# Patient Record
Sex: MALE | Race: WHITE | URBAN - METROPOLITAN AREA
[De-identification: names, ages, dates, MRNs, and addresses within clinical notes are randomized per-mention and may not be internally consistent; named-entity substitution may affect disease eponyms.]

---

## 2017-12-31 ENCOUNTER — HOSPITAL ENCOUNTER (EMERGENCY)
Facility: CLINIC | Age: 62
Discharge: HOME | End: 2017-12-31
Attending: NURSE PRACTITIONER | Admitting: NURSE PRACTITIONER
Payer: OTHER GOVERNMENT

## 2017-12-31 VITALS
SYSTOLIC BLOOD PRESSURE: 137 MMHG | OXYGEN SATURATION: 98 % | RESPIRATION RATE: 15 BRPM | DIASTOLIC BLOOD PRESSURE: 80 MMHG | TEMPERATURE: 97.9 F

## 2017-12-31 DIAGNOSIS — J11.1 INFLUENZA-LIKE ILLNESS: Primary | ICD-10-CM

## 2017-12-31 PROCEDURE — 99213 OFFICE O/P EST LOW 20 MIN: CPT

## 2017-12-31 PROCEDURE — 99213 OFFICE O/P EST LOW 20 MIN: CPT | Performed by: NURSE PRACTITIONER

## 2017-12-31 RX ORDER — OSELTAMIVIR PHOSPHATE 75 MG/1
75 CAPSULE ORAL 2 TIMES DAILY
Qty: 10 CAPSULE | Refills: 0 | Status: SHIPPED | OUTPATIENT
Start: 2017-12-31 | End: 2018-01-05

## 2017-12-31 ASSESSMENT — ENCOUNTER SYMPTOMS
SINUS PRESSURE: 1
SHORTNESS OF BREATH: 0
FEVER: 1
EYE REDNESS: 0
DIFFICULTY URINATING: 0
ACTIVITY CHANGE: 1
FATIGUE: 1
WHEEZING: 0
CHEST TIGHTNESS: 0
CHOKING: 0
CHILLS: 1
EYE DISCHARGE: 0
COUGH: 1
DIAPHORESIS: 1
SORE THROAT: 1
EYE PAIN: 0
RHINORRHEA: 1
APPETITE CHANGE: 1

## 2017-12-31 NOTE — ED AVS SNAPSHOT
[facility]    [address] [address]    Phone:  [phone]    Fax:  [phone]                                       Christopher Cullen   MRN: [id number]    Department:  [facility]   Date of Visit:  12/31/2017           Patient Information     Date Of Birth          1955        Your diagnoses for this visit were:     Influenza-like illness        You were seen by Aminta Armenta APRN CNP.      Follow-up Information     Follow up with Vilma Salgado NP In 1 week.    Specialty:  Nurse Practitioner - Family    Why:  If symptoms worsen, As needed    Contact information:    [address] [facility] [address]  [phone]        24 Hour Appointment Hotline       To make an appointment at any [facility] clinic, call [phone] ([phone]). If you don't have a family doctor or clinic, we will help you find one. [facility] clinics are conveniently located to serve the needs of you and your family.             Review of your medicines      START taking        Dose / Directions Last dose taken    oseltamivir 75 MG capsule   Commonly known as:  TAMIFLU   Dose:  75 mg   Quantity:  10 capsule        Take 1 capsule (75 mg) by mouth 2 times daily for 5 days   Refills:  0          Our records show that you are taking the medicines listed below. If these are incorrect, please call your family doctor or clinic.        Dose / Directions Last dose taken    aspirin 81 MG tablet   Dose:  1 tablet        Take 1 tablet by mouth daily.   Refills:  0        clotrimazole 1 % cream   Commonly known as:  LOTRIMIN   Quantity:  30 g        Apply to face and chest once daily in the morning.   Refills:  1        desonide 0.05 % cream   Commonly known as:  DESOWEN   Quantity:  30 g        Apply once daily at night to rash.   Refills:  1        HYDROcodone-acetaminophen 5-325 MG per tablet   Commonly known as:  NORCO   Dose:  1-2 tablet   Quantity:  24  tablet        Take 1-2 tablets by mouth every 6 hours as needed for moderate to severe pain   Refills:  0        ketoconazole 2 % shampoo   Commonly known as:  NIZORAL   Quantity:  120 mL        Apply to the affected area and wash off after 5 minutes. Use daily in shower   Refills:  3        methocarbamol 500 MG tablet   Commonly known as:  ROBAXIN   Dose:  500-1000 mg   Quantity:  30 tablet        Take 1-2 tablets (500-1,000 mg) by mouth 3 times daily as needed for muscle spasms   Refills:  0        senna-docusate 8.6-50 MG per tablet   Commonly known as:  [employer]S SENNA PLUS   Dose:  1 tablet   Quantity:  40 tablet        Take 1 tablet by mouth 2 times daily as needed for constipation.   Refills:  2        sildenafil 100 MG tablet   Commonly known as:  VIAGRA   Quantity:  10 tablet        Take  by mouth. Take 1/4 tab daily.    Never use with nitroglycerin, terazosin or doxazosin.   Refills:  11        simvastatin 20 MG tablet   Commonly known as:  ZOCOR   Dose:  20 mg   Quantity:  90 tablet        Take 1 tablet by mouth At Bedtime.   Refills:  3        TYLENOL 325 MG tablet   Generic drug:  acetaminophen        as needed   Refills:  0        VISTARIL 25 MG capsule   Dose:  25 mg   Generic drug:  hydrOXYzine        Take 25 mg by mouth 3 times daily as needed.   Refills:  0                Prescriptions were sent or printed at these locations (1 Prescription)                   [facility] [address] [address]    Telephone:  [phone]   Fax:  [phone]   Hours:                  E-Prescribed (1 of 1)         oseltamivir (TAMIFLU) 75 MG capsule                Orders Needing Specimen Collection     None      Pending Results     No orders found from 12/29/2017 to 1/1/2018.            Pending Culture Results     No orders found from 12/29/2017 to 1/1/2018.            Pending Results Instructions     If you had any lab results that were not finalized at the  "time of your Discharge, you can call the ED Lab Result RN at [phone]. You will be contacted by this team for any positive Lab results or changes in treatment. The nurses are available 7 days a week from 10A to 6:30P.  You can leave a message 24 hours per day and they will return your call.        Test Results From Your Hospital Stay               Thank you for choosing [facility]       Thank you for choosing [facility] for your care. Our goal is always to provide you with excellent care. Hearing back from our patients is one way we can continue to improve our services. Please take a few minutes to complete the written survey that you may receive in the mail after you visit with us. Thank you!        [employer] Information     1calendar lets you send messages to your doctor, view your test results, renew your prescriptions, schedule appointments and more. To sign up, go to [email or web address] . Click on \"Log in\" on the left side of the screen, which will take you to the Welcome page. Then click on \"Sign up Now\" on the right side of the page.     You will be asked to enter the access code listed below, as well as some personal information. Please follow the directions to create your username and password.     Your access code is: [email or web address]  Expires: 3/31/2018  6:29 PM     Your access code will  in 90 days. If you need help or a new code, please call your [facility] clinic or [phone].        Care EveryWhere ID     This is your Care EveryWhere ID. This could be used by other organizations to access your [facility] medical records  [id number]        Equal Access to Services     DOREEN ESCOBAR : Hadii barry Zavala, wadonalda lottieadaha, qaybta kaalmaalejandra tian. So [facility] [phone].    ATENCIÓN: Si habla español, tiene a laureano disposición servicios gratuitos de asistencia lingüística. Llame al [phone].    We comply with applicable federal civil rights laws and " [place] laws. We do not discriminate on the basis of race, color, national origin, age, disability, sex, sexual orientation, or gender identity.            After Visit Summary       This is your record. Keep this with you and show to your community pharmacist(s) and doctor(s) at your next visit.

## 2017-12-31 NOTE — ED AVS SNAPSHOT
[facility]    [address] [facility] [address]    Phone:  [phone]    Fax:  [phone]                                       Christopher Cullen   MRN: [id number]    Department:  [facility]   Date of Visit:  12/31/2017           After Visit Summary Signature Page     I have received my discharge instructions, and my questions have been answered. I have discussed any challenges I see with this plan with the nurse or doctor.    ..........................................................................................................................................  Patient/Patient Representative Signature      ..........................................................................................................................................  Patient Representative Print Name and Relationship to Patient    ..................................................               ................................................  Date                                            Time    ..........................................................................................................................................  Reviewed by Signature/Title    ...................................................              ..............................................  Date                                                            Time

## 2018-01-01 NOTE — ED PROVIDER NOTES
History     Chief Complaint   Patient presents with     URI     for 2 weeks, productive cough     HPI  Christopher Cullen is a 62 year old male who presents with reports of cold and cough type symptoms for the past 2 weeks and then since Friday new onset of fever, aches, chills, sinus congestion, worsening cough, tightness in the chest, headache, fatigue, emesis once this past Friday with none since, decreased activity, decreased appetite,.  Patient denies chest pain, vomiting, dysuria.    Problem List:    Patient Active Problem List    Diagnosis Date Noted     Lipoma of other skin and subcutaneous tissue 09/27/2012     Priority: Medium     Hyperlipidemia LDL goal <130 03/22/2012     Priority: Medium     Prostate cancer (H) 03/21/2012     Priority: Medium     S/P complete repair of rotator cuff 05/26/2010     Priority: Medium        Past Medical History:    Past Medical History:   Diagnosis Date     Hyperlipidaemia      Prostate cancer (H) 3/21/2012       Past Surgical History:    Past Surgical History:   Procedure Laterality Date     ARTHROSCOPY SHOULDER ROTATOR CUFF REPAIR  11/26/10    left shoulder     DAVINCI PROSTATECTOMY  3/30/2012    Procedure:DAVINCI PROSTATECTOMY; Davinci Assisted Laparoscopic Radical Prostatectomy; Surgeon:GIRMA DAWKINS; Location:[facility]     ESOPHAGOSCOPY, GASTROSCOPY, DUODENOSCOPY (EGD), COMBINED N/A 12/21/2015    Procedure: COMBINED ESOPHAGOSCOPY, GASTROSCOPY, DUODENOSCOPY (EGD), REMOVE FOREIGN BODY;  Surgeon: Jarocho Abbott MD;  Location: [facility]     HC REPAIR ROTATOR CUFF,ACUTE  5/21/10    right shoulder     SURGICAL HISTORY OF -       removal of cyst       Family History:    Family History   Problem Relation Age of Onset     Depression Mother      Arthritis Mother      C.A.D. Mother      MI     Lipids Father      HIGH CHOLESTEROL       Social History:  Marital Status:   [2]  Social History   Substance Use Topics     Smoking status: Former Smoker     Packs/day: 1.00      Years: 30.00     Types: Cigarettes     Smokeless tobacco: Never Used      Comment: quit 2000     Alcohol use No        Medications:      oseltamivir (TAMIFLU) 75 MG capsule   HYDROcodone-acetaminophen (NORCO) 5-325 MG per tablet   methocarbamol (ROBAXIN) 500 MG tablet   clotrimazole (LOTRIMIN) 1 % cream   desonide (DESOWEN) 0.05 % cream   ketoconazole (NIZORAL) 2 % shampoo   sildenafil (VIAGRA) 100 MG tablet   senna-docusate (CVS SENNA PLUS) 8.6-50 MG per tablet   aspirin 81 MG tablet   simvastatin (ZOCOR) 20 MG tablet   hydrOXYzine (VISTARIL) 25 MG capsule   TYLENOL 325 MG PO TABS     On simvastatin,  Fish oil  Vitamin D  ASA 81  Vitamin C - 2 tabs daily  Review of Systems   Constitutional: Positive for activity change, appetite change, chills, diaphoresis, fatigue and fever.   HENT: Positive for congestion, postnasal drip, rhinorrhea, sinus pressure and sore throat. Negative for ear discharge and ear pain.    Eyes: Negative for pain, discharge and redness.   Respiratory: Positive for cough. Negative for choking, chest tightness, shortness of breath and wheezing.    Cardiovascular: Negative for chest pain.   Genitourinary: Negative for difficulty urinating.   All other systems reviewed and are negative.      Physical Exam   BP: 137/80  Heart Rate: 82  Temp: 97.9  F (36.6  C)  Resp: 15  SpO2: 98 %      Physical Exam   Constitutional: He appears well-developed and well-nourished. He is cooperative. He does not appear ill.   HENT:   Head: Normocephalic and atraumatic.   Right Ear: Hearing, tympanic membrane, external ear and ear canal normal.   Left Ear: Hearing, tympanic membrane, external ear and ear canal normal.   Nose: Mucosal edema (with erythema) and rhinorrhea present.   Mouth/Throat: Uvula is midline, oropharynx is clear and moist and mucous membranes are normal.   Eyes: Conjunctivae are normal. Right eye exhibits no discharge. Left eye exhibits no discharge.   Neck: Neck supple. No tracheal deviation  present. No thyromegaly present.   Cardiovascular: Normal rate, regular rhythm and normal heart sounds.  Exam reveals no gallop and no friction rub.    No murmur heard.  Pulmonary/Chest: Effort normal and breath sounds normal. No respiratory distress. He has no wheezes. He has no rales. He exhibits no tenderness.   Lymphadenopathy:     He has cervical adenopathy (moderate).   Neurological: He is alert.   Skin: Skin is warm. No rash noted.   Psychiatric: He has a normal mood and affect.   Nursing note and vitals reviewed.      ED Course     ED Course     Procedures      Labs Ordered and Resulted from Time of ED Arrival Up to the Time of Departure from the ED - No data to display    Assessments & Plan (with Medical Decision Making)     I have reviewed the nursing notes.    I have reviewed the findings, diagnosis, plan and need for follow up with the patient.  Christopher Cullen is a 62 year old male who presents with reports of cold and cough type symptoms for the past 2 weeks and then since Friday new onset of fever, aches, chills, sinus congestion, worsening cough, tightness in the chest, headache, fatigue, emesis once this past Friday with none since, decreased activity, decreased appetite,.  Patient denies chest pain, vomiting, dysuria.    Exam as noted above.  Explained the patient the options of testing and also explained that patient has classic symptoms for the influenza which was now very prevalent.  Discussed treatment with Tamiflu versus no treatment and discussed treatment for the influenza virus in general.  Patient wishes to take the Tamiflu and verbalizes understanding.  He denies any questions at this point in time.  DDx:  bronchitis, pneumonia, Viral URI (ie.. Influenza), sinusitis    Discharge Medication List as of 12/31/2017  6:29 PM      START taking these medications    Details   oseltamivir (TAMIFLU) 75 MG capsule Take 1 capsule (75 mg) by mouth 2 times daily for 5 days, Disp-10 capsule, R-0,  E-Prescribe             Final diagnoses:   Influenza-like illness       12/31/2017   [facility] EMERGENCY DEPARTMENT     Aminta Armenta, APRN CNP  12/31/17 1938

## 2018-01-04 ENCOUNTER — HOSPITAL ENCOUNTER (EMERGENCY)
Facility: CLINIC | Age: 63
Discharge: HOME | End: 2018-01-04
Attending: PHYSICIAN ASSISTANT | Admitting: PHYSICIAN ASSISTANT
Payer: OTHER GOVERNMENT

## 2018-01-04 VITALS
RESPIRATION RATE: 16 BRPM | SYSTOLIC BLOOD PRESSURE: 138 MMHG | OXYGEN SATURATION: 98 % | DIASTOLIC BLOOD PRESSURE: 75 MMHG | TEMPERATURE: 98.4 F

## 2018-01-04 DIAGNOSIS — J11.1 INFLUENZA-LIKE ILLNESS: ICD-10-CM

## 2018-01-04 DIAGNOSIS — R05.9 COUGH: ICD-10-CM

## 2018-01-04 PROCEDURE — G0463 HOSPITAL OUTPT CLINIC VISIT: HCPCS

## 2018-01-04 PROCEDURE — 99213 OFFICE O/P EST LOW 20 MIN: CPT | Performed by: PHYSICIAN ASSISTANT

## 2018-01-04 RX ORDER — BENZONATATE 200 MG/1
200 CAPSULE ORAL 3 TIMES DAILY PRN
Qty: 30 CAPSULE | Refills: 0 | Status: SHIPPED | OUTPATIENT
Start: 2018-01-04 | End: 2018-08-16

## 2018-01-04 NOTE — ED AVS SNAPSHOT
[facility]    [address] [facility] [address]    Phone:  [phone]    Fax:  [phone]                                       Christopher Cullen   MRN: [id number]    Department:  [facility]   Date of Visit:  1/4/2018           Patient Information     Date Of Birth          1955        Your diagnoses for this visit were:     Influenza-like illness     Cough        You were seen by Polo Senior PA-C.      Discharge References/Attachments     (ADULT), INFLUENZA (ENGLISH)      24 Hour Appointment Hotline       To make an appointment at any [facility] clinic, call [phone] ([phone]). If you don't have a family doctor or clinic, we will help you find one. [facility] clinics are conveniently located to serve the needs of you and your family.             Review of your medicines      START taking        Dose / Directions Last dose taken    benzonatate 200 MG capsule   Commonly known as:  TESSALON   Dose:  200 mg   Quantity:  30 capsule        Take 1 capsule (200 mg) by mouth 3 times daily as needed for cough   Refills:  0          Our records show that you are taking the medicines listed below. If these are incorrect, please call your family doctor or clinic.        Dose / Directions Last dose taken    aspirin 81 MG tablet   Dose:  1 tablet        Take 1 tablet by mouth daily.   Refills:  0        clotrimazole 1 % cream   Commonly known as:  LOTRIMIN   Quantity:  30 g        Apply to face and chest once daily in the morning.   Refills:  1        desonide 0.05 % cream   Commonly known as:  DESOWEN   Quantity:  30 g        Apply once daily at night to rash.   Refills:  1        HYDROcodone-acetaminophen 5-325 MG per tablet   Commonly known as:  NORCO   Dose:  1-2 tablet   Quantity:  24 tablet        Take 1-2 tablets by mouth every 6 hours as needed for moderate to severe pain   Refills:  0        ketoconazole 2 % shampoo   Commonly known as:   NIZORAL   Quantity:  120 mL        Apply to the affected area and wash off after 5 minutes. Use daily in shower   Refills:  3        methocarbamol 500 MG tablet   Commonly known as:  ROBAXIN   Dose:  500-1000 mg   Quantity:  30 tablet        Take 1-2 tablets (500-1,000 mg) by mouth 3 times daily as needed for muscle spasms   Refills:  0        oseltamivir 75 MG capsule   Commonly known as:  TAMIFLU   Dose:  75 mg   Quantity:  10 capsule        Take 1 capsule (75 mg) by mouth 2 times daily for 5 days   Refills:  0        senna-docusate 8.6-50 MG per tablet   Commonly known as:  CVS SENNA PLUS   Dose:  1 tablet   Quantity:  40 tablet        Take 1 tablet by mouth 2 times daily as needed for constipation.   Refills:  2        sildenafil 100 MG tablet   Commonly known as:  VIAGRA   Quantity:  10 tablet        Take  by mouth. Take 1/4 tab daily.    Never use with nitroglycerin, terazosin or doxazosin.   Refills:  11        simvastatin 20 MG tablet   Commonly known as:  ZOCOR   Dose:  20 mg   Quantity:  90 tablet        Take 1 tablet by mouth At Bedtime.   Refills:  3        TYLENOL 325 MG tablet   Generic drug:  acetaminophen        as needed   Refills:  0        VISTARIL 25 MG capsule   Dose:  25 mg   Generic drug:  hydrOXYzine        Take 25 mg by mouth 3 times daily as needed.   Refills:  0                Prescriptions were sent or printed at these locations (1 Prescription)                   [facility] [place], [place] - [address] [facility]   [address] [facility] [address]    Telephone:  [phone]   Fax:  [phone]   Hours:                  E-Prescribed (1 of 1)         benzonatate (TESSALON) 200 MG capsule                Orders Needing Specimen Collection     None      Pending Results     No orders found from 1/2/2018 to 1/5/2018.            Pending Culture Results     No orders found from 1/2/2018 to 1/5/2018.            Pending Results Instructions     If you had any lab results that were  "not finalized at the time of your Discharge, you can call the ED Lab Result RN at [phone]. You will be contacted by this team for any positive Lab results or changes in treatment. The nurses are available 7 days a week from 10A to 6:30P.  You can leave a message 24 hours per day and they will return your call.        Test Results From Your Hospital Stay               Thank you for choosing [facility]       Thank you for choosing [facility] for your care. Our goal is always to provide you with excellent care. Hearing back from our patients is one way we can continue to improve our services. Please take a few minutes to complete the written survey that you may receive in the mail after you visit with us. Thank you!        [employer] Information     [facility] lets you send messages to your doctor, view your test results, renew your prescriptions, schedule appointments and more. To sign up, go to [email or web address] . Click on \"Log in\" on the left side of the screen, which will take you to the Welcome page. Then click on \"Sign up Now\" on the right side of the page.     You will be asked to enter the access code listed below, as well as some personal information. Please follow the directions to create your username and password.     Your access code is: [email or web address]  Expires: 3/31/2018  6:29 PM     Your access code will  in 90 days. If you need help or a new code, please call your [facility] clinic or [phone].        Care EveryWhere ID     This is your Care EveryWhere ID. This could be used by other organizations to access your [facility] medical records  [id number]        Equal Access to Services     [facility]: Hadii barry aZvala, wadonalda lurebekah, qaybta kaalalejandra arevalo . So [facility] [phone].    ATENCIÓN: Si habla español, tiene a laureano disposición servicios gratuitos de asistencia lingüística. Llame al [phone].    We comply with applicable federal " civil rights laws and [place] laws. We do not discriminate on the basis of race, color, national origin, age, disability, sex, sexual orientation, or gender identity.            After Visit Summary       This is your record. Keep this with you and show to your community pharmacist(s) and doctor(s) at your next visit.

## 2018-01-04 NOTE — ED PROVIDER NOTES
Chief Complaint:     Chief Complaint   Patient presents with     URI     Pt had cold last week, in bed Friday to Saturday.  Seen on Sunday, started on Tamiflu.  Keeping fluids down, not feeling much better.  Coughing frequently and feeling drained.        HPI: Christopher Cullen is an 62 year old male who presents with a cough. Patient was seen 5 days ago and Dx with influenza.  He was started on Tamiflu, and is feeling better.  He continues to cough, and this is worse at night.  His body aches and fever have resolved.  He does get an occasional headache usually after coughing.  He has not taken anything for the headache.  He has not had any tylenol since yesterday.  He denies any chest pain or shortness of breath.  No hemoptysis.  No diarrhea, abdominal pain or vomiting.  No neck pain.       Recent travel?  no.    ROS: Further problem focused system review was otherwise negative.     Respiratory History  occasional episodes of bronchitis     Problem history  Patient Active Problem List   Diagnosis     S/P complete repair of rotator cuff     Prostate cancer (H)     Hyperlipidemia LDL goal <130     Lipoma of other skin and subcutaneous tissue        Allergies  No Known Allergies     Smoking History  History   Smoking Status     Former Smoker     Packs/day: 1.00     Years: 30.00     Types: Cigarettes   Smokeless Tobacco     Never Used     Comment: quit 2000        Current Meds  No current facility-administered medications for this encounter.     Current Outpatient Prescriptions:      benzonatate (TESSALON) 200 MG capsule, Take 1 capsule (200 mg) by mouth 3 times daily as needed for cough, Disp: 30 capsule, Rfl: 0     oseltamivir (TAMIFLU) 75 MG capsule, Take 1 capsule (75 mg) by mouth 2 times daily for 5 days, Disp: 10 capsule, Rfl: 0     HYDROcodone-acetaminophen (NORCO) 5-325 MG per tablet, Take 1-2 tablets by mouth every 6 hours as needed for moderate to severe pain, Disp: 24 tablet, Rfl: 0     methocarbamol (ROBAXIN)  500 MG tablet, Take 1-2 tablets (500-1,000 mg) by mouth 3 times daily as needed for muscle spasms, Disp: 30 tablet, Rfl: 0     clotrimazole (LOTRIMIN) 1 % cream, Apply to face and chest once daily in the morning., Disp: 30 g, Rfl: 1     desonide (DESOWEN) 0.05 % cream, Apply once daily at night to rash., Disp: 30 g, Rfl: 1     ketoconazole (NIZORAL) 2 % shampoo, Apply to the affected area and wash off after 5 minutes. Use daily in shower, Disp: 120 mL, Rfl: 3     sildenafil (VIAGRA) 100 MG tablet, Take  by mouth. Take 1/4 tab daily.    Never use with nitroglycerin, terazosin or doxazosin., Disp: 10 tablet, Rfl: 11     senna-docusate ([employer] SENNA PLUS) 8.6-50 MG per tablet, Take 1 tablet by mouth 2 times daily as needed for constipation., Disp: 40 tablet, Rfl: 2     aspirin 81 MG tablet, Take 1 tablet by mouth daily., Disp: , Rfl:      simvastatin (ZOCOR) 20 MG tablet, Take 1 tablet by mouth At Bedtime., Disp: 90 tablet, Rfl: 3     hydrOXYzine (VISTARIL) 25 MG capsule, Take 25 mg by mouth 3 times daily as needed., Disp: , Rfl:      TYLENOL 325 MG PO TABS, as needed, Disp: , Rfl:         OBJECTIVE     Vital signs reviewed by Polo Senior  /75  Temp 98.4  F (36.9  C) (Oral)  Resp 16  SpO2 98%     PEFR:  General appearance: healthy, alert and no distress  Ears: R TM - normal: no effusions, no erythema, and normal landmarks, L TM - normal: no effusions, no erythema, and normal landmarks  Eyes: R normal, L normal  Nose: mucosal edema  Oropharynx: mild erythema  Neck: supple and no adenopathy  Lungs: normal and clear to auscultation  Heart: S1, S2 normal, no murmur, click, rub or gallop, regular rate and rhythm, chest is clear without rales or wheezing, no pedal edema, no JVD, no hepatosplenomegaly  Abdomen: Abdomen soft, non-tender without masses or organomegaly         ASSESSMENT    1. Influenza-like illness    2. Cough        PLAN  Patient is improving.  Discussed length of influenza illness.  Discussed  imaging and patient declined at this time.   Rest, Push fluids, vaporizer, elevation of head of bed.  Tessalon cough suppressant- TID PRN- as discussed.   If symptoms worsen, recheck immediately otherwise follow up with your PCP PRN.  Patient verbalized understanding and agreed with this plan.       Polo Senior  1/4/2018, 12:00 PM       Polo Senior PA-C  01/04/18 122

## 2018-01-04 NOTE — ED AVS SNAPSHOT
[facility]    [address] [address]    Phone:  [phone]    Fax:  [phone]                                       Christopher Cullen   MRN: [id number]    Department:  [facility]   Date of Visit:  1/4/2018           After Visit Summary Signature Page     I have received my discharge instructions, and my questions have been answered. I have discussed any challenges I see with this plan with the nurse or doctor.    ..........................................................................................................................................  Patient/Patient Representative Signature      ..........................................................................................................................................  Patient Representative Print Name and Relationship to Patient    ..................................................               ................................................  Date                                            Time    ..........................................................................................................................................  Reviewed by Signature/Title    ...................................................              ..............................................  Date                                                            Time

## 2018-02-01 ENCOUNTER — TRANSFERRED RECORDS (OUTPATIENT)
Dept: HEALTH INFORMATION MANAGEMENT | Facility: CLINIC | Age: 63
End: 2018-02-01

## 2018-02-02 ENCOUNTER — TRANSFERRED RECORDS (OUTPATIENT)
Dept: HEALTH INFORMATION MANAGEMENT | Facility: CLINIC | Age: 63
End: 2018-02-02

## 2018-05-15 ENCOUNTER — TRANSFERRED RECORDS (OUTPATIENT)
Dept: HEALTH INFORMATION MANAGEMENT | Facility: CLINIC | Age: 63
End: 2018-05-15

## 2018-06-14 ENCOUNTER — TRANSFERRED RECORDS (OUTPATIENT)
Dept: HEALTH INFORMATION MANAGEMENT | Facility: CLINIC | Age: 63
End: 2018-06-14

## 2018-06-26 ENCOUNTER — TRANSFERRED RECORDS (OUTPATIENT)
Dept: HEALTH INFORMATION MANAGEMENT | Facility: CLINIC | Age: 63
End: 2018-06-26

## 2018-08-10 DIAGNOSIS — M54.50 LOWER BACK PAIN: Primary | ICD-10-CM

## 2018-08-16 ENCOUNTER — RADIANT APPOINTMENT (OUTPATIENT)
Dept: GENERAL RADIOLOGY | Facility: CLINIC | Age: 63
End: 2018-08-16
Attending: NEUROLOGICAL SURGERY

## 2018-08-16 ENCOUNTER — OFFICE VISIT (OUTPATIENT)
Dept: NEUROSURGERY | Facility: CLINIC | Age: 63
End: 2018-08-16
Payer: OTHER GOVERNMENT

## 2018-08-16 ENCOUNTER — RADIANT APPOINTMENT (OUTPATIENT)
Dept: GENERAL RADIOLOGY | Facility: CLINIC | Age: 63
End: 2018-08-16
Attending: NEUROLOGICAL SURGERY
Payer: OTHER GOVERNMENT

## 2018-08-16 VITALS
HEART RATE: 63 BPM | DIASTOLIC BLOOD PRESSURE: 77 MMHG | WEIGHT: 197.4 LBS | BODY MASS INDEX: 27.64 KG/M2 | HEIGHT: 71 IN | SYSTOLIC BLOOD PRESSURE: 132 MMHG

## 2018-08-16 DIAGNOSIS — C61 PROSTATE CANCER (H): ICD-10-CM

## 2018-08-16 DIAGNOSIS — M54.50 LOWER BACK PAIN: ICD-10-CM

## 2018-08-16 DIAGNOSIS — M53.3 SACROILIAC JOINT DYSFUNCTION OF RIGHT SIDE: Primary | ICD-10-CM

## 2018-08-16 ASSESSMENT — PAIN SCALES - GENERAL: PAINLEVEL: NO PAIN (0)

## 2018-08-16 NOTE — PATIENT INSTRUCTIONS
Recommend Sacroiliac joint PT and conservative management.  No indication for lumbar surgical intervention at this time.    Regarding incidental findings of T2 hyperintensity of lumbar vertebral body- see primary care physician to order a PET CT scan due to history of prostate cancer to rule out a metastatic lesion there. This lesion is NOT related to presenting symptoms but should be evaluated to be sure it does not represent malignancy. [facility]

## 2018-08-16 NOTE — PROGRESS NOTES
Chief Complaint: Worsening RLE pain.    History of Present Illness:  It was a pleasure to evaluate Christopher Cullen in clinic today.    Christopher Cullen is a 63 year old male referred to Dr. Hayden by the [facility] presenting with 3 years of RLE pain from just above the knee to 2/3 of the way down his calf, worsened in the past 8 months.  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    He denies pain in his upper RLE/hip and LLE.    His leg pain is particularly worsened while driving for long distances, which he does several times per month; while driving, he experiences the pain approximately once per hour.  His leg pain is improved with positional weight offloading while sitting and with walking and stretching of his RLE.    Mr. Cullen denies taking any medications for relief of his leg pain.  He has undergone physical therapy with traction for his back pain while serving in the  but has not undertaken physical therapy for his leg pain other than self-directed workouts.  He has not received any injections or surgery for his pain.      Review of Systems   Constitutional: Negative for fatigue, change in level of energy, or unexpected weight change.  HENT: Negative for trouble swallowing.    Eyes: Wears spectacles. Negative for recent changes.  Respiratory: Negative for shortness of breath.    Cardiovascular: Negative for leg swelling.  Gastrointestinal: Negative for nausea, vomiting.  Endocrine: Negative for heat/cold intolerance.  Genitourinary: Negative for incontinence, frequency and urgency.   Musculoskeletal: Positive for right-sided buttock pain, intermittent right lower extremity pain, and neck stiffness. Negative for gait problem, neck pain, and left lower extremity pain.  Skin: Negative for recent changes.  Allergic/Immunologic: Negative for immunocompromised  state.  Neurological: Negative for speech difficulty, weakness, and numbness.   Hematological: Does not bruise/bleed easily.  Psychiatric/Behavioral: The patient is not nervous/anxious.    Past Medical History:   Diagnosis Date     Hyperlipidaemia      Prostate cancer (H) 3/21/2012       Past Surgical History:   Procedure Laterality Date     ARTHROSCOPY SHOULDER ROTATOR CUFF REPAIR  11/26/10    left shoulder     DAVINCI PROSTATECTOMY  3/30/2012    Procedure:DAVINCI PROSTATECTOMY; Davinci Assisted Laparoscopic Radical Prostatectomy; Surgeon:GIRMA DAWKINS; Location:[facility]     ESOPHAGOSCOPY, GASTROSCOPY, DUODENOSCOPY (EGD), COMBINED N/A 12/21/2015    Procedure: COMBINED ESOPHAGOSCOPY, GASTROSCOPY, DUODENOSCOPY (EGD), REMOVE FOREIGN BODY;  Surgeon: Jarocho Abbott MD;  Location: [facility]     HC REPAIR ROTATOR CUFF,ACUTE  5/21/10    right shoulder     SURGICAL HISTORY OF -       removal of cyst       Social History     Social History     Marital status:      Spouse name: N/A     Number of children: N/A     Years of education: N/A     Occupational History      [employer]     Social History Main Topics     Smoking status: Former Smoker     Packs/day: 1.00     Years: 30.00     Types: Cigarettes     Smokeless tobacco: Never Used      Comment: quit 2000     Alcohol use No     Drug use: No     Sexual activity: Not Asked     Other Topics Concern     Parent/Sibling W/ Cabg, Mi Or Angioplasty Before 65f 55m? No     Social History Narrative       family history includes Arthritis in his mother; C.A.D. in his mother; Depression in his mother; Lipids in his father.        IMAGING per my own measurement and interpretation:  Xrays:standing long cassette 8/16/2018    Pelvic Incidence: 45 degrees  Lumbar Lordosis: 32 degrees  PI-LL mismatch: 13 degrees      Sagittal vertical alignment (C7 samir line to posterior corner of sacrum): 67mm      MRI lumbar spine 6/14/2018:  No significant foraminal stenosis corresponding to  "L3, L4, or L5 right-sided nerve roots. Multilevel spondylosis and central/lateral recess stenosis    Resulted Imaging/Labs:  Bone Density:  DEXA 6/26/2018:  Normal bone density    Vitamin D:  No results found for: LEN763, FWGR878, IONM80BIPHA, VITD3, D2VIT, D3VIT, DTOT, [id number], [id number], [id number], [id number], [id number], [id number]    Nutritional Status:    Estimated body mass index is 27.53 kg/(m^2) as calculated from the following:    Height as of this encounter: 1.803 m (5' 11\").    Weight as of this encounter: 89.5 kg (197 lb 6.4 oz).    Complete \"Weight Managment Plan\" in the progress note from the Adult Preventative or Medicare smartsets, use phrase .WEIGHTPLAN, or choose an option from Weight Management Resources smartset below.      No results found for: ALBUMIN    Diabetes Screening:  Lab Results   Component Value Date    A1C 5.5 09/23/2011       Nicotine Usage:  Previous cigarette smoker: 30 pack-year history.     Physical Exam   Constitutional: Oriented to person, place, and time. Appears well-developed and well-nourished. Cooperative. No distress.   HENT:  Head: Normocephalic and atraumatic.   Eyes: Bespectacled. Sclerae anicteric.  Pulmonary/Chest: Effort normal.  Musculoskeletal:   Lumbar flexion/extension range of motion:   Leg pain not reproduced with flexion abduction external rotation of hip, axial loading of the hip, or prolonged crossing of legs.     SI joint tenderness to finger palpation.    Neurological: Alert and oriented to person, place, and time. Gait normal. Displays no Babinski's sign on the right side. Displays no Babinski's sign on the left side.   Reflex Scores:        Patellar reflexes are 2+ on the right side and 2+ on the left side.       Achilles reflexes are 2+ on the right side and 2+ on the left side.    STRENGTH LEFT RIGHT       Hip Flexion     5     5   Knee Extension 5 5   Ankle Dorsiflexion 5 5   Extensor Hallucis Longus 5 5   Plantar Flexion 5 5   Foot eversion 5 5 "   Foot inversion 5 5     Able to tandem walk      Sacroiliac Joint Exam LEFT RIGHT   Marilyn Finger Test - +   PSIS tenderness - +   ThighThrust - -   MARIA GUADALUPE - +   Pelvic Gapping - -   Pelvic Compression - +   Gaenslen s - +   Sacral Thrust - -   Hip Exam     Posterior impingement - -   Medial femoral impingement - -       Skin: Skin is warm, dry and intact.  Psychiatric: Normal mood and affect. Speech is normal and behavior is normal.      ASSESSMENT:  Christopher Cullen is a 63 year old male with notable past medical history of prostate cancer and 30 pack-year smoking history presenting with chronic lower back pain/stiffness and 3 years of right lower extremity pain worsened over the past 8 months.  Patient history and physical examination most suggestive of pain secondary to osteoarthritis of the right-sided sacroiliac joint.    MRI findings do not demonstrate substantial nerve root compression that would correspond to the distribution of his pain.  Additionally, the patient's report of pain with sitting and improved with walking further suggests that his pain is likely not neurologic in origin.    However, given Mr. Cullen's history of prostate cancer s/p prostatectomy and L3 and L4 vertebral body hyperintensities on MRI, further workup should be pursued given differential diagnosis of hemangioma versus possible metastatic lesion.    PLAN:  During this patient encounter, Dr. Hayden educated the patient on several pain treatment techniques, including weight offloading/sacral repositioning, leg crossing as potential cause of SI.    Mr. Cullen will require physical therapy for arthritis of right-sided sacroiliac joint.     Additionally, the patient will require followup PET nuclear scan of at least the lumbar vertebrae to further evaluate L3 and L4 vertebral body hyperintensities seen on MRI.  We advised Mr. Cullen that he should pursue this workup via his PCP at the [facility].    Aly Hayden,  M.D.  Neurosurgery Resident, PGY-1  (P) [phone]      Right sacroiliac joint irritation on exam; no radicular-distribution leg pain (isolated pain from knee to lateral calf, not provoked with Tinel's not consistent with peroneal nerve pain), no weakness or numbness. Symptoms are worse with sitting and relieved by standing and walking.    Recommend Sacroiliac joint PT (ordered) and conservative management.    No indication for lumbar surgical intervention at this time- lumbar degenerative spondylosis on MRI and small PI-LL mismatch of 13 degrees on xray is not causing significant symptoms at this time. No significant right L5-S1 foraminal stenosis    DEXA normal  Regarding incidental findings of T2 hyperintensity of lumbar vertebral body- I agree with [facility]'s recommendation that primary care physician order a PET CT scan due to patient's history of prostate cancer to rule out a metastatic lesion there. This lesion is NOT related to patient's presenting symptoms.     No further scheduled followup needed with me. Patient [facility] to [facility] and followup those results there.    Thirty pages of [facility] medical records obtained and reviewed including DEXA scan, MRI, lumbar xrays, prior PCP visit reviewing prostate cancer treated with prostatectomy      I saw, evaluated, and examined the patient with the resident and personally reviewed and interpreted all imaging and labs and formulated the plan.    Kristi Hayden MD    [facility] Department of Neurosurgery  Office: [phone]    8/16/2018            reviewed and/or ordered clinical laboratory tests, reviewed and/or ordered tests in radiology, reviewed and/or ordered medical tests, made the decision to obtain old records and/or history from someone other than the patient and Reviewed and summarized old records and/or discussed this case with another health care provider

## 2018-08-16 NOTE — MR AVS SNAPSHOT
After Visit Summary   2018    Christopher Cullen    MRN: [id number]           Patient Information     Date Of Birth          1955        Visit Information        Provider Department      2018 7:15 AM Kristi Hayden MD [facility] Neurosurgery        Today's Diagnoses     Sacroiliac joint dysfunction of right side    -  1    Prostate cancer (H)          Care Instructions    Recommend Sacroiliac joint PT and conservative management.  No indication for lumbar surgical intervention at this time.    Regarding incidental findings of T2 hyperintensity of lumbar vertebral body- see primary care physician to order a PET CT scan due to history of prostate cancer to rule out a metastatic lesion there. This lesion is NOT related to presenting symptoms but should be evaluated to be sure it does not represent malignancy. [facility]              Follow-ups after your visit        Who to contact     Please call your clinic at [phone] to:    Ask questions about your health    Make or cancel appointments    Discuss your medicines    Learn about your test results    Speak to your doctor            Additional Information About Your Visit        [employer] Information     [employer] is an electronic gateway that provides easy, online access to your medical records. With [employer], you can request a clinic appointment, read your test results, renew a prescription or communicate with your care team.     To sign up for [employer] visit the website at [email or web address]   You will be asked to enter the access code listed below, as well as some personal information. Please follow the directions to create your username and password.     Your access code is: [email or web address]  Expires: 10/31/2018  6:30 AM     Your access code will  in 90 days. If you need help or a new code, please contact your [facility] or call [phone] for assistance.        Care  "EveryWhere ID     This is your Care EveryWhere ID. This could be used by other organizations to access your [place] medical records  [id number]        Your Vitals Were     Pulse Height BMI (Body Mass Index)             63 1.803 m (5' 11\") 27.53 kg/m2          Blood Pressure from Last 3 Encounters:   08/16/18 132/77   01/04/18 138/75   12/31/17 137/80    Weight from Last 3 Encounters:   08/16/18 89.5 kg (197 lb 6.4 oz)   03/21/16 95.3 kg (210 lb)   04/13/15 96.9 kg (213 lb 9.6 oz)              Today, you had the following     No orders found for display       Primary Care Provider Fax #    [facility] [phone] [address] [address]        Equal Access to Services     DOREEN ESCOBAR : Gilles Zavala, waaxlaurel longorai, qaybta kaalmalaurel burnette, alejandra salgado . So [facility] [phone].    ATENCIÓN: Si habla español, tiene a laureano disposición servicios gratuitos de asistencia lingüística. Renetta al [phone].    We comply with applicable federal civil rights laws and [place] laws. We do not discriminate on the basis of race, color, national origin, age, disability, sex, sexual orientation, or gender identity.            Thank you!     Thank you for choosing [facility]  for your care. Our goal is always to provide you with excellent care. Hearing back from our patients is one way we can continue to improve our services. Please take a few minutes to complete the written survey that you may receive in the mail after your visit with us. Thank you!             Your Updated Medication List - Protect others around you: Learn how to safely use, store and throw away your medicines at www.disposemymeds.org.          This list is accurate as of 8/16/18  7:52 AM.  Always use your most recent med list.                   Brand Name Dispense Instructions for use Diagnosis    aspirin 81 MG tablet      Take 1 tablet by mouth daily.        " clotrimazole 1 % cream    LOTRIMIN    30 g    Apply to face and chest once daily in the morning.    Seborrhoeic dermatitis       desonide 0.05 % cream    DESOWEN    30 g    Apply once daily at night to rash.    Seborrhoeic dermatitis       HYDROcodone-acetaminophen 5-325 MG per tablet    NORCO    24 tablet    Take 1-2 tablets by mouth every 6 hours as needed for moderate to severe pain    Acute low back pain       ketoconazole 2 % shampoo    NIZORAL    120 mL    Apply to the affected area and wash off after 5 minutes. Use daily in shower    Seborrhoeic dermatitis       sildenafil 100 MG tablet    VIAGRA    10 tablet    Take  by mouth. Take 1/4 tab daily.    Never use with nitroglycerin, terazosin or doxazosin.    Malignant neoplasm of prostate (H)       simvastatin 20 MG tablet    ZOCOR    90 tablet    Take 1 tablet by mouth At Bedtime.        TYLENOL 325 MG tablet   Generic drug:  acetaminophen      as needed

## 2018-08-16 NOTE — LETTER
8/16/2018     RE: Christopher Cullen  [address]  [facility] [address]     Dear Colleague,    Thank you for referring your patient, Christopher Cullen, to the [facility] NEUROSURGERY at [facility]. Please see a copy of my visit note below.          Chief Complaint: Worsening RLE pain.    History of Present Illness:  It was a pleasure to evaluate Christopher Cullen in clinic today.    Christopher Cullen is a 63 year old male referred to Dr. Hayden by the [facility] presenting with 3 years of RLE pain from just above the knee to 2/3 of the way down his calf, worsened in the past 8 months.  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    He denies pain in his upper RLE/hip and LLE.    His leg pain is particularly worsened while driving for long distances, which he does several times per month; while driving, he experiences the pain approximately once per hour.  His leg pain is improved with positional weight offloading while sitting and with walking and stretching of his RLE.    Mr. Cullen denies taking any medications for relief of his leg pain.  He has undergone physical therapy with traction for his back pain while serving in the  but has not undertaken physical therapy for his leg pain other than self-directed workouts.  He has not received any injections or surgery for his pain.      Review of Systems   Constitutional: Negative for fatigue, change in level of energy, or unexpected weight change.  HENT: Negative for trouble swallowing.    Eyes: Wears spectacles. Negative for recent changes.  Respiratory: Negative for shortness of breath.    Cardiovascular: Negative for leg swelling.  Gastrointestinal: Negative for nausea, vomiting.  Endocrine: Negative for heat/cold intolerance.  Genitourinary: Negative for incontinence, frequency and urgency.    Musculoskeletal: Positive for right-sided buttock pain, intermittent right lower extremity pain, and neck stiffness. Negative for gait problem, neck pain, and left lower extremity pain.  Skin: Negative for recent changes.  Allergic/Immunologic: Negative for immunocompromised state.  Neurological: Negative for speech difficulty, weakness, and numbness.   Hematological: Does not bruise/bleed easily.  Psychiatric/Behavioral: The patient is not nervous/anxious.    Past Medical History:   Diagnosis Date     Hyperlipidaemia      Prostate cancer (H) 3/21/2012       Past Surgical History:   Procedure Laterality Date     ARTHROSCOPY SHOULDER ROTATOR CUFF REPAIR  11/26/10    left shoulder     DAVINCI PROSTATECTOMY  3/30/2012    Procedure:DAVINCI PROSTATECTOMY; Davinci Assisted Laparoscopic Radical Prostatectomy; Surgeon:GIRMA DAWKINS; Location: OR     ESOPHAGOSCOPY, GASTROSCOPY, DUODENOSCOPY (EGD), COMBINED N/A 12/21/2015    Procedure: COMBINED ESOPHAGOSCOPY, GASTROSCOPY, DUODENOSCOPY (EGD), REMOVE FOREIGN BODY;  Surgeon: Jarocho Abbott MD;  Location: [facility]     HC REPAIR ROTATOR CUFF,ACUTE  5/21/10    right shoulder     SURGICAL HISTORY OF -       removal of cyst       Social History     Social History     Marital status:      Spouse name: N/A     Number of children: N/A     Years of education: N/A     Occupational History      [employer]     Social History Main Topics     Smoking status: Former Smoker     Packs/day: 1.00     Years: 30.00     Types: Cigarettes     Smokeless tobacco: Never Used      Comment: quit 2000     Alcohol use No     Drug use: No     Sexual activity: Not Asked     Other Topics Concern     Parent/Sibling W/ Cabg, Mi Or Angioplasty Before 65f 55m? No     Social History Narrative       family history includes Arthritis in his mother; C.A.D. in his mother; Depression in his mother; Lipids in his father.        IMAGING per my own measurement and interpretation:  Xrays:standing long  "cassette 8/16/2018    Pelvic Incidence: 45 degrees  Lumbar Lordosis: 32 degrees  PI-LL mismatch: 13 degrees      Sagittal vertical alignment (C7 samir line to posterior corner of sacrum): 67mm      MRI lumbar spine 6/14/2018:  No significant foraminal stenosis corresponding to L3, L4, or L5 right-sided nerve roots. Multilevel spondylosis and central/lateral recess stenosis    Resulted Imaging/Labs:  Bone Density:  DEXA 6/26/2018:  Normal bone density    Vitamin D:  No results found for: GYP481, FAEA872, SYBP18CHBCQ, VITD3, D2VIT, D3VIT, DTOT, [id number], [id number], [id number], [id number], [id number], [id number]    Nutritional Status:    Estimated body mass index is 27.53 kg/(m^2) as calculated from the following:    Height as of this encounter: 1.803 m (5' 11\").    Weight as of this encounter: 89.5 kg (197 lb 6.4 oz).    Complete \"Weight Managment Plan\" in the progress note from the Adult Preventative or Medicare smartsets, use phrase .WEIGHTPLAN, or choose an option from Weight Management Resources smartset below.      No results found for: ALBUMIN    Diabetes Screening:  Lab Results   Component Value Date    A1C 5.5 09/23/2011       Nicotine Usage:  Previous cigarette smoker: 30 pack-year history.     Physical Exam   Constitutional: Oriented to person, place, and time. Appears well-developed and well-nourished. Cooperative. No distress.   HENT:  Head: Normocephalic and atraumatic.   Eyes: Bespectacled. Sclerae anicteric.  Pulmonary/Chest: Effort normal.  Musculoskeletal:   Lumbar flexion/extension range of motion:   Leg pain not reproduced with flexion abduction external rotation of hip, axial loading of the hip, or prolonged crossing of legs.     SI joint tenderness to finger palpation.    Neurological: Alert and oriented to person, place, and time. Gait normal. Displays no Babinski's sign on the right side. Displays no Babinski's sign on the left side.   Reflex Scores:        Patellar reflexes are 2+ on the " right side and 2+ on the left side.       Achilles reflexes are 2+ on the right side and 2+ on the left side.    STRENGTH LEFT RIGHT       Hip Flexion     5     5   Knee Extension 5 5   Ankle Dorsiflexion 5 5   Extensor Hallucis Longus 5 5   Plantar Flexion 5 5   Foot eversion 5 5   Foot inversion 5 5     Able to tandem walk      Sacroiliac Joint Exam LEFT RIGHT   Marilyn Finger Test - +   PSIS tenderness - +   ThighThrust - -   MARIA GUADALUPE - +   Pelvic Gapping - -   Pelvic Compression - +   Gaenslen s - +   Sacral Thrust - -   Hip Exam     Posterior impingement - -   Medial femoral impingement - -       Skin: Skin is warm, dry and intact.  Psychiatric: Normal mood and affect. Speech is normal and behavior is normal.      ASSESSMENT:  Christopher Cullen is a 63 year old male with notable past medical history of prostate cancer and 30 pack-year smoking history presenting with chronic lower back pain/stiffness and 3 years of right lower extremity pain worsened over the past 8 months.  Patient history and physical examination most suggestive of pain secondary to osteoarthritis of the right-sided sacroiliac joint.    MRI findings do not demonstrate substantial nerve root compression that would correspond to the distribution of his pain.  Additionally, the patient's report of pain with sitting and improved with walking further suggests that his pain is likely not neurologic in origin.    However, given Mr. Cullen's history of prostate cancer s/p prostatectomy and L3 and L4 vertebral body hyperintensities on MRI, further workup should be pursued given differential diagnosis of hemangioma versus possible metastatic lesion.    PLAN:  During this patient encounter, Dr. Hayden educated the patient on several pain treatment techniques, including weight offloading/sacral repositioning, leg crossing as potential cause of SI.    Mr. Cullen will require physical therapy for arthritis of right-sided sacroiliac joint.     Additionally, the  patient will require followup PET nuclear scan of at least the lumbar vertebrae to further evaluate L3 and L4 vertebral body hyperintensities seen on MRI.  We advised Mr. Cullen that he should pursue this workup via his PCP at the [facility].    Aly Hayden M.D.  Neurosurgery Resident, PGY-1  (P) [phone]      Right sacroiliac joint irritation on exam; no radicular-distribution leg pain (isolated pain from knee to lateral calf, not provoked with Tinel's not consistent with peroneal nerve pain), no weakness or numbness. Symptoms are worse with sitting and relieved by standing and walking.    Recommend Sacroiliac joint PT (ordered) and conservative management.    No indication for lumbar surgical intervention at this time- lumbar degenerative spondylosis on MRI and small PI-LL mismatch of 13 degrees on xray is not causing significant symptoms at this time. No significant right L5-S1 foraminal stenosis    DEXA normal  Regarding incidental findings of T2 hyperintensity of lumbar vertebral body- I agree with [facility]'s recommendation that primary care physician order a PET CT scan due to patient's history of prostate cancer to rule out a metastatic lesion there. This lesion is NOT related to patient's presenting symptoms.     No further scheduled followup needed with me. Patient [facility] to [facility] and followup those results there.    Thirty pages of [facility] medical records obtained and reviewed including DEXA scan, MRI, lumbar xrays, prior PCP visit reviewing prostate cancer treated with prostatectomy      I saw, evaluated, and examined the patient with the resident and personally reviewed and interpreted all imaging and labs and formulated the plan.    Kristi Hayden MD    [facility] Department of Neurosurgery  Office: [phone]    8/16/2018            reviewed and/or ordered clinical laboratory tests, reviewed and/or ordered  tests in radiology, reviewed and/or ordered medical tests, made the decision to obtain old records and/or history from someone other than the patient and Reviewed and summarized old records and/or discussed this case with another health care provider    Again, thank you for allowing me to participate in the care of your patient.      Sincerely,    Kristi Hayden MD

## 2018-08-27 ENCOUNTER — HOSPITAL ENCOUNTER (OUTPATIENT)
Dept: PHYSICAL THERAPY | Facility: CLINIC | Age: 63
Setting detail: THERAPIES SERIES
End: 2018-08-27
Attending: NEUROLOGICAL SURGERY
Payer: COMMERCIAL

## 2018-08-27 PROCEDURE — 97140 MANUAL THERAPY 1/> REGIONS: CPT | Mod: GP | Performed by: PHYSICAL THERAPIST

## 2018-08-27 PROCEDURE — 97110 THERAPEUTIC EXERCISES: CPT | Mod: GP | Performed by: PHYSICAL THERAPIST

## 2018-08-27 PROCEDURE — 97162 PT EVAL MOD COMPLEX 30 MIN: CPT | Mod: GP | Performed by: PHYSICAL THERAPIST

## 2018-08-27 PROCEDURE — 40000718 ZZHC STATISTIC PT DEPARTMENT ORTHO VISIT: Performed by: PHYSICAL THERAPIST

## 2018-08-27 NOTE — PROGRESS NOTES
08/27/18 1500   General Information   Type of Visit Initial OP Ortho PT Evaluation   Start of Care Date 08/27/18   Referring Physician Kristi Hayden MD   Patient/Family Goals Statement To resolve the pain in my R LE w/ sitting   Orders Evaluate and Treat   Date of Order 08/16/18   Insurance Type Other  ([facility] choice)   Medical Diagnosis R SI joint dysfunction   Body Part(s)   Body Part(s) Lumbar Spine/SI   Presentation and Etiology   Pertinent history of current problem (include personal factors and/or comorbidities that impact the POC) Pt presents w/ R LBP and also notes pain in R post knee pain that radiates up to the back which has increased in past 8 months.  Pt started noticing it more w/ driving to home in [facility] ( 6 hours).  Pain intermittent 8/10.  Pt notes intermittent numbness post R knee and into the calf.  No LE weakness.  Negative bowel / bladder / cough/ sneeze.   Xray in chart:  Severe degenerative changes of the lumbar spine most predominant at L4-S1, mild hip degeneration. MRI in chart: deg changes/ stenosis central and foraminal.   Pt is also going to have a PET scan.  Meds: alleve prn.  PMHX:  prostate cancer 2012,  B RCR 2010.   Chronic back pain X 20 years.  Moderate: reoccuring pain   Impairments A. Pain;E. Decreased flexibility;F. Decreased strength and endurance;K. Numbness;L. Tingling   Pain quality A. Sharp;C. Aching   Pain exacerbation comment driving X 1 hour notes pain in lower leg that shoots up into the back.  Riding motorcycle 1-1.5 hours (not as bad as riding in the car).   Sitting > 1.5 hours   Pain/symptoms eased by B. Walking;E. Changing positions   Progression of symptoms since onset: Worsened  (pain comes on more frequently)   Prior Level of Function   Functional Level Prior Comment Pt has worked on ALHAJI/ PPU.  Treadmill 3-5 X/wk (none in past couple months) .  Pt reports he is doing yardwork.   Pt had been doing yoga 1X/wk  not in past couple weeks.     Current  Level of Function   Patient role/employment history F. Retired;A. 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 No   Have you fallen and had an injury in the past year? No   Is patient a fall risk? No   Lumbar Spine/SI Objective Findings   Posture Decreased lumbar lordosis.  R PSIS/ crest slightly lower..  L LE slightly longer supine.     Gait/Locomotion No limp, able to heel/toe walk.  Pt notes some tightness    Flexion ROM 50% w/ increased tightness   Extension ROM 30% tightness feels like it may produce sharp pain   Right Side Bending ROM 60%   Left Side Bending ROM 60%   Pelvic Screen R FB test +.  SI compression/ gap (-)   Hip Screen PROM ER R 43*, L 41*;  IR R 22*, L 20*.  R FADIR + for buttock pain,  L neg.  B scour/ MARIA GUADALUPE neg   Hip Flexion (L2) Strength B 5/5   Hip Abduction Strength B 4/5   Knee Flexion Strength B 5/5   Knee Extension (L3) Strength B 5/5   Ankle Dorsiflexion (L4) Strength B 5/5   Great Toe Extension (L5) Strength B 5/5   Hamstring Flexibility moderate tightness B    SLR R +, L neg   Crossover SLR neg B    Slump Test notes tightness in B LE,  R side + for LBP   Segmental Mobility Hypomobile w/ PA testing,  pain @ L5/S1   Palpation No muscular tenderness   Repeated Extension Prone Prone lying notes tension,  ALHAJI--releases some tension as does PPU.     Planned Therapy Interventions   Planned Therapy Interventions manual therapy;strengthening;stretching  (body mechanics)   Clinical Impression   Criteria for Skilled Therapeutic Interventions Met yes, treatment indicated   PT Diagnosis LBP/ SI pain   Influenced by the following impairments pain, stiffness, decreased strength, numbness   Functional limitations due to impairments sitting, driving   Clinical Presentation Evolving/Changing   Clinical Presentation Rationale symptoms are  worsening--having to change positions or get out of the car more frequently   Clinical Decision Making (Complexity) Moderate complexity   Therapy Frequency 1 time/week   Predicted Duration of Therapy Intervention (days/wks) 4 weeks then1 X in 2 weeks = 5 visits   Risk & Benefits of therapy have been explained Yes   Patient, Family & other staff in agreement with plan of care Yes   Education Assessment   Barriers to Learning No barriers   Ortho Goal 1   Goal Description 1. Pt will tolerate driving 1.5 hours w/ pain no > 4/10   Target Date 10/16/18   Ortho Goal 2   Goal Description 2.   Pt will be independent and consistent w/ HEP and return to a walking program   Target Date 10/16/18   Total Evaluation Time   Total Evaluation Time 30     Thank you for this referral,    Jasmin Archer, PT,  CEAS   #[id number]  [facility]  [phone]

## 2018-09-05 ENCOUNTER — HOSPITAL ENCOUNTER (OUTPATIENT)
Dept: PHYSICAL THERAPY | Facility: CLINIC | Age: 63
Setting detail: THERAPIES SERIES
End: 2018-09-05
Attending: NEUROLOGICAL SURGERY
Payer: OTHER GOVERNMENT

## 2018-09-05 PROCEDURE — 97110 THERAPEUTIC EXERCISES: CPT | Mod: GP | Performed by: PHYSICAL THERAPIST

## 2018-09-05 PROCEDURE — 40000718 ZZHC STATISTIC PT DEPARTMENT ORTHO VISIT: Performed by: PHYSICAL THERAPIST

## 2018-09-12 ENCOUNTER — HOSPITAL ENCOUNTER (OUTPATIENT)
Dept: PET IMAGING | Facility: CLINIC | Age: 63
Discharge: HOME | End: 2018-09-12
Attending: FAMILY MEDICINE | Admitting: FAMILY MEDICINE
Payer: COMMERCIAL

## 2018-09-12 DIAGNOSIS — M54.5 LOW BACK PAIN, UNSPECIFIED BACK PAIN LATERALITY, UNSPECIFIED CHRONICITY, WITH SCIATICA PRESENCE UNSPECIFIED: ICD-10-CM

## 2018-09-12 LAB
CREAT BLD-MCNC: 1.1 MG/DL (ref 0.66–1.25)
GFR SERPL CREATININE-BSD FRML MDRD: 67 ML/MIN/1.7M2

## 2018-09-12 PROCEDURE — 82565 ASSAY OF CREATININE: CPT

## 2018-09-12 PROCEDURE — 74177 CT ABD & PELVIS W/CONTRAST: CPT

## 2018-09-12 PROCEDURE — 34300033 ZZH RX 343

## 2018-09-12 PROCEDURE — A9552 F18 FDG: HCPCS

## 2018-09-12 PROCEDURE — 25000128 H RX IP 250 OP 636

## 2018-09-12 RX ORDER — IOPAMIDOL 755 MG/ML
1-135 INJECTION, SOLUTION INTRAVASCULAR ONCE
Status: COMPLETED | OUTPATIENT
Start: 2018-09-12 | End: 2018-09-12

## 2018-09-12 RX ADMIN — FLUDEOXYGLUCOSE F-18 11.78 MCI.: 500 INJECTION, SOLUTION INTRAVENOUS at 09:41

## 2018-09-12 RX ADMIN — IOPAMIDOL 100 ML: 755 INJECTION, SOLUTION INTRAVENOUS at 09:41

## 2018-09-25 ENCOUNTER — HOSPITAL ENCOUNTER (OUTPATIENT)
Dept: PHYSICAL THERAPY | Facility: CLINIC | Age: 63
Setting detail: THERAPIES SERIES
End: 2018-09-25
Attending: NEUROLOGICAL SURGERY
Payer: COMMERCIAL

## 2018-09-25 PROCEDURE — 40000718 ZZHC STATISTIC PT DEPARTMENT ORTHO VISIT: Performed by: PHYSICAL THERAPIST

## 2018-09-25 PROCEDURE — 97110 THERAPEUTIC EXERCISES: CPT | Mod: GP | Performed by: PHYSICAL THERAPIST

## 2018-10-30 NOTE — ADDENDUM NOTE
Encounter addended by: Jasmin Archer, PT on: 10/30/2018  2:09 PM<BR>     Actions taken: Sign clinical note, Flowsheet accepted, Episode resolved

## 2018-10-30 NOTE — PROGRESS NOTES
OUTPATIENT PHYSICAL THERAPY DISCHARGE SUMMARY   Kristi Hayden MD 8/27/18 to 09/25/18 0700   Signing Clinician's Name / Credentials   Signing clinician's name / credentials Jasmin Archer, PT [id number]   Session Number   Session Number 3  [facility]   Ortho Goal 1   Goal Description 1. Pt will tolerate driving 1.5 hours w/ pain no > 4/10.  9/25/18 can go up to 6/10   Target Date 10/16/18   Ortho Goal 2   Goal Description 2.   Pt will be independent and consistent w/ HEP and return to a walking program   Target Date 10/16/18   Subjective Report   Subjective Report Pt reports PET scan neg but is going to have colonoscopy for further evaluation.  Pt notes sitting / driving are the main irritation after about 1 hour.  Pain level w/ driving 6/10, otherwise no pain.    No complaints w/ exercises   Objective Measure   Objective Measure PSIS/ crest level.  Neg FB test.  Neg ERS/ FRS   System Outcome Measures   Outcome Measures Low Back Pain (see Oswestry and Shayla)  (YONY 20%)   Therapeutic Procedure/exercise   Treatment Detail seated piriformis stretch.   Seated nerve glides.   Supine TA set w/ marching X 10 B.   4 pt TA set w/ UE/LE lift.  Seated HS stretch B.   clams w/ RTB X 20 B.  RTB bridge X 15.  Seated TA set.  Reinforced getting out of car regularly and to try icing.     Plan   Home program ex as above   Plan  Discharge from physical therapy as patient has not been seen in past 30 days.    Comments   Comments Unable to report progress towards goals as patient did not return.